# Patient Record
Sex: MALE | Race: WHITE | Employment: FULL TIME | URBAN - METROPOLITAN AREA
[De-identification: names, ages, dates, MRNs, and addresses within clinical notes are randomized per-mention and may not be internally consistent; named-entity substitution may affect disease eponyms.]

---

## 2017-08-22 ENCOUNTER — TELEPHONE (OUTPATIENT)
Dept: FAMILY MEDICINE CLINIC | Age: 52
End: 2017-08-22

## 2017-08-24 ENCOUNTER — HOSPITAL ENCOUNTER (OUTPATIENT)
Dept: OTHER | Age: 52
Discharge: HOME | End: 2017-08-24
Attending: INTERNAL MEDICINE | Admitting: INTERNAL MEDICINE

## 2017-08-24 ENCOUNTER — OFFICE VISIT (OUTPATIENT)
Dept: FAMILY MEDICINE CLINIC | Age: 52
End: 2017-08-24

## 2017-08-24 VITALS
RESPIRATION RATE: 12 BRPM | HEART RATE: 55 BPM | OXYGEN SATURATION: 96 % | SYSTOLIC BLOOD PRESSURE: 111 MMHG | BODY MASS INDEX: 24.96 KG/M2 | HEIGHT: 67 IN | DIASTOLIC BLOOD PRESSURE: 65 MMHG | WEIGHT: 159 LBS

## 2017-08-24 DIAGNOSIS — M79.672 LEFT FOOT PAIN: ICD-10-CM

## 2017-08-24 DIAGNOSIS — M79.672 LEFT FOOT PAIN: Primary | ICD-10-CM

## 2017-08-24 LAB — VITAMIN D 25-HYDROXY: 40.1 NG/ML

## 2017-08-24 PROCEDURE — 99213 OFFICE O/P EST LOW 20 MIN: CPT | Performed by: INTERNAL MEDICINE

## 2017-08-25 DIAGNOSIS — M79.89 LIMB SWELLING: Primary | ICD-10-CM

## 2017-08-28 ENCOUNTER — TELEPHONE (OUTPATIENT)
Dept: FAMILY MEDICINE CLINIC | Age: 52
End: 2017-08-28

## 2017-08-30 ENCOUNTER — TELEPHONE (OUTPATIENT)
Dept: FAMILY MEDICINE CLINIC | Age: 52
End: 2017-08-30

## 2017-09-01 ENCOUNTER — TELEPHONE (OUTPATIENT)
Dept: FAMILY MEDICINE CLINIC | Age: 52
End: 2017-09-01

## 2017-09-01 DIAGNOSIS — M79.89 LIMB SWELLING: Primary | ICD-10-CM

## 2017-09-30 ENCOUNTER — HOSPITAL ENCOUNTER (OUTPATIENT)
Dept: VASCULAR LAB | Age: 52
Discharge: HOME | End: 2017-09-30
Attending: INTERNAL MEDICINE | Admitting: INTERNAL MEDICINE

## 2017-09-30 DIAGNOSIS — M79.89 OTHER DISORDERS OF SOFT TISSUE: ICD-10-CM

## 2017-10-03 ENCOUNTER — TELEPHONE (OUTPATIENT)
Dept: FAMILY MEDICINE CLINIC | Age: 52
End: 2017-10-03

## 2018-05-21 ENCOUNTER — OFFICE VISIT (OUTPATIENT)
Dept: FAMILY MEDICINE CLINIC | Age: 53
End: 2018-05-21

## 2018-05-21 VITALS
DIASTOLIC BLOOD PRESSURE: 64 MMHG | HEIGHT: 67 IN | TEMPERATURE: 99.1 F | WEIGHT: 154 LBS | BODY MASS INDEX: 24.17 KG/M2 | HEART RATE: 70 BPM | SYSTOLIC BLOOD PRESSURE: 99 MMHG

## 2018-05-21 DIAGNOSIS — K52.9 AGE (ACUTE GASTROENTERITIS): Primary | ICD-10-CM

## 2018-05-21 PROCEDURE — 99213 OFFICE O/P EST LOW 20 MIN: CPT | Performed by: FAMILY MEDICINE

## 2018-05-21 RX ORDER — ONDANSETRON 4 MG/1
4 TABLET, FILM COATED ORAL EVERY 8 HOURS PRN
Qty: 20 TABLET | Refills: 0 | Status: SHIPPED | OUTPATIENT
Start: 2018-05-21 | End: 2019-01-18 | Stop reason: ALTCHOICE

## 2018-05-21 ASSESSMENT — ENCOUNTER SYMPTOMS
ABDOMINAL PAIN: 1
DIARRHEA: 1
RESPIRATORY NEGATIVE: 1
NAUSEA: 1
WHEEZING: 0
VOMITING: 0
CONSTIPATION: 0
COUGH: 0
SHORTNESS OF BREATH: 0

## 2018-10-08 ENCOUNTER — OFFICE VISIT (OUTPATIENT)
Dept: FAMILY MEDICINE CLINIC | Age: 53
End: 2018-10-08
Payer: COMMERCIAL

## 2018-10-08 VITALS
DIASTOLIC BLOOD PRESSURE: 70 MMHG | HEART RATE: 46 BPM | BODY MASS INDEX: 24.64 KG/M2 | WEIGHT: 157 LBS | HEIGHT: 67 IN | SYSTOLIC BLOOD PRESSURE: 107 MMHG

## 2018-10-08 DIAGNOSIS — Z13.220 SCREENING CHOLESTEROL LEVEL: ICD-10-CM

## 2018-10-08 DIAGNOSIS — R51.9 NONINTRACTABLE HEADACHE, UNSPECIFIED CHRONICITY PATTERN, UNSPECIFIED HEADACHE TYPE: Primary | ICD-10-CM

## 2018-10-08 DIAGNOSIS — K21.00 REFLUX ESOPHAGITIS: ICD-10-CM

## 2018-10-08 DIAGNOSIS — R42 VERTIGO: ICD-10-CM

## 2018-10-08 DIAGNOSIS — Z23 NEED FOR VACCINATION: ICD-10-CM

## 2018-10-08 DIAGNOSIS — Z12.11 SCREEN FOR COLON CANCER: ICD-10-CM

## 2018-10-08 PROCEDURE — 99214 OFFICE O/P EST MOD 30 MIN: CPT | Performed by: FAMILY MEDICINE

## 2018-10-08 RX ORDER — PANTOPRAZOLE SODIUM 40 MG/1
40 TABLET, DELAYED RELEASE ORAL DAILY
Qty: 30 TABLET | Refills: 11 | Status: SHIPPED | OUTPATIENT
Start: 2018-10-08 | End: 2019-01-18 | Stop reason: ALTCHOICE

## 2018-10-08 RX ORDER — SUMATRIPTAN 100 MG/1
100 TABLET, FILM COATED ORAL
Qty: 9 TABLET | Refills: 3 | Status: SHIPPED | OUTPATIENT
Start: 2018-10-08 | End: 2020-01-13 | Stop reason: SDUPTHER

## 2018-10-08 RX ORDER — MECLIZINE HCL 12.5 MG/1
12.5 TABLET ORAL 3 TIMES DAILY PRN
Qty: 30 TABLET | Refills: 0 | Status: SHIPPED | OUTPATIENT
Start: 2018-10-08 | End: 2018-10-18

## 2018-10-08 ASSESSMENT — PATIENT HEALTH QUESTIONNAIRE - PHQ9
2. FEELING DOWN, DEPRESSED OR HOPELESS: 0
SUM OF ALL RESPONSES TO PHQ9 QUESTIONS 1 & 2: 0
SUM OF ALL RESPONSES TO PHQ QUESTIONS 1-9: 0
1. LITTLE INTEREST OR PLEASURE IN DOING THINGS: 0
SUM OF ALL RESPONSES TO PHQ QUESTIONS 1-9: 0

## 2018-10-08 ASSESSMENT — ENCOUNTER SYMPTOMS
SHORTNESS OF BREATH: 0
ABDOMINAL PAIN: 0
NAUSEA: 1
CONSTIPATION: 0
VOMITING: 0
DIARRHEA: 0
COUGH: 0

## 2018-11-12 ENCOUNTER — OFFICE VISIT (OUTPATIENT)
Dept: FAMILY MEDICINE CLINIC | Age: 53
End: 2018-11-12
Payer: COMMERCIAL

## 2018-11-12 VITALS
DIASTOLIC BLOOD PRESSURE: 64 MMHG | BODY MASS INDEX: 25.88 KG/M2 | SYSTOLIC BLOOD PRESSURE: 102 MMHG | WEIGHT: 161 LBS | HEIGHT: 66 IN | HEART RATE: 53 BPM

## 2018-11-12 DIAGNOSIS — R51.9 NONINTRACTABLE EPISODIC HEADACHE, UNSPECIFIED HEADACHE TYPE: ICD-10-CM

## 2018-11-12 DIAGNOSIS — H53.9 VISION CHANGES: ICD-10-CM

## 2018-11-12 DIAGNOSIS — K21.00 REFLUX ESOPHAGITIS: Primary | ICD-10-CM

## 2018-11-12 DIAGNOSIS — R42 VERTIGO: ICD-10-CM

## 2018-11-12 PROCEDURE — 99214 OFFICE O/P EST MOD 30 MIN: CPT | Performed by: FAMILY MEDICINE

## 2018-11-12 ASSESSMENT — ENCOUNTER SYMPTOMS
SHORTNESS OF BREATH: 0
NAUSEA: 1
ABDOMINAL PAIN: 0
DIARRHEA: 0
VOMITING: 0
COUGH: 0
CONSTIPATION: 0

## 2018-11-12 NOTE — PROGRESS NOTES
Chief Complaint   Patient presents with    Headache    Dizziness         HPI:  Laura Rodrigez is a 48 y.o. (:1965) here today for follow up on headaches and  Dizziness. He has that the headaches are not under control he said that he has had headaches that have woke him up in the middle of the night. He says that he still feels pressure between his ears. He says that he still has that the dizziness is intermittent as well. He says that he can go days without being dizzy then he will have times where his longest stretch of dizziness has lasted up to an hour. He says that the nausea he had was when the dizzy spells would come and be very intense, but he also gets nausea with his migraines. He describes his migraines as dull to quick sharp pain. He will take Excedrin Migraine if he catches the migraine soon enough if not he takes the Imitrex and has to lay down in a cold dark from. He says that right in clinic he has some dizziness and feels as though he has a feeling that he is in a fog. He says that he has new symptoms of right eye pressure (as if someone were pushing his eye from the inside out) Rates his pain today 1/10. He says that at times it's really bad. He said that he has a start burst effect in his eye right as if someone put the sun in front of his right eye. He said that he was sleeping on a couple of occasions and he had a sensation of someone holding a bright flash light to his right eye that woke him up. There are times though that he has the opposite of the bright sensation and has a black blotchy visual effect that he can see through. He says that right in clinic he has some dizziness and feels as though he has a feeling that he is in a fog. He is wondering if some of this stress (work) related or maybe allergy related.         He is compliant with his reflux medications and notes no heartburn or food sticking in their throat, regurgitation,  and bitter taste,       Review of

## 2019-01-16 ENCOUNTER — ANESTHESIA EVENT (OUTPATIENT)
Dept: ENDOSCOPY | Age: 54
End: 2019-01-16
Payer: COMMERCIAL

## 2019-01-18 RX ORDER — M-VIT,TX,IRON,MINS/CALC/FOLIC 27MG-0.4MG
1 TABLET ORAL DAILY
COMMUNITY

## 2019-01-21 ENCOUNTER — ANESTHESIA (OUTPATIENT)
Dept: ENDOSCOPY | Age: 54
End: 2019-01-21
Payer: COMMERCIAL

## 2019-01-21 ENCOUNTER — HOSPITAL ENCOUNTER (OUTPATIENT)
Age: 54
Setting detail: OUTPATIENT SURGERY
Discharge: HOME | End: 2019-01-21
Attending: INTERNAL MEDICINE | Admitting: INTERNAL MEDICINE
Payer: COMMERCIAL

## 2019-01-21 VITALS
HEART RATE: 49 BPM | BODY MASS INDEX: 24.15 KG/M2 | DIASTOLIC BLOOD PRESSURE: 60 MMHG | TEMPERATURE: 98 F | HEIGHT: 67 IN | OXYGEN SATURATION: 98 % | RESPIRATION RATE: 18 BRPM | WEIGHT: 153.88 LBS | SYSTOLIC BLOOD PRESSURE: 99 MMHG

## 2019-01-21 VITALS
OXYGEN SATURATION: 99 % | DIASTOLIC BLOOD PRESSURE: 56 MMHG | RESPIRATION RATE: 17 BRPM | SYSTOLIC BLOOD PRESSURE: 97 MMHG

## 2019-01-21 DIAGNOSIS — Z12.11 SCREEN FOR COLON CANCER: ICD-10-CM

## 2019-01-21 PROCEDURE — 3609010600 HC COLONOSCOPY POLYPECTOMY SNARE/COLD BIOPSY: Performed by: INTERNAL MEDICINE

## 2019-01-21 PROCEDURE — 7100000000 HC PACU RECOVERY - FIRST 15 MIN: Performed by: INTERNAL MEDICINE

## 2019-01-21 PROCEDURE — 6360000002 HC RX W HCPCS: Performed by: NURSE ANESTHETIST, CERTIFIED REGISTERED

## 2019-01-21 PROCEDURE — 2500000003 HC RX 250 WO HCPCS: Performed by: NURSE ANESTHETIST, CERTIFIED REGISTERED

## 2019-01-21 PROCEDURE — 7100000010 HC PHASE II RECOVERY - FIRST 15 MIN: Performed by: INTERNAL MEDICINE

## 2019-01-21 PROCEDURE — 3700000001 HC ADD 15 MINUTES (ANESTHESIA): Performed by: INTERNAL MEDICINE

## 2019-01-21 PROCEDURE — 7100000011 HC PHASE II RECOVERY - ADDTL 15 MIN: Performed by: INTERNAL MEDICINE

## 2019-01-21 PROCEDURE — 2580000003 HC RX 258: Performed by: ANESTHESIOLOGY

## 2019-01-21 PROCEDURE — 2709999900 HC NON-CHARGEABLE SUPPLY: Performed by: INTERNAL MEDICINE

## 2019-01-21 PROCEDURE — 7100000001 HC PACU RECOVERY - ADDTL 15 MIN: Performed by: INTERNAL MEDICINE

## 2019-01-21 PROCEDURE — 3700000000 HC ANESTHESIA ATTENDED CARE: Performed by: INTERNAL MEDICINE

## 2019-01-21 PROCEDURE — 88305 TISSUE EXAM BY PATHOLOGIST: CPT

## 2019-01-21 RX ORDER — PROPOFOL 10 MG/ML
INJECTION, EMULSION INTRAVENOUS CONTINUOUS PRN
Status: DISCONTINUED | OUTPATIENT
Start: 2019-01-21 | End: 2019-01-21 | Stop reason: SDUPTHER

## 2019-01-21 RX ORDER — SODIUM CHLORIDE 9 MG/ML
INJECTION, SOLUTION INTRAVENOUS CONTINUOUS
Status: DISCONTINUED | OUTPATIENT
Start: 2019-01-21 | End: 2019-01-21 | Stop reason: HOSPADM

## 2019-01-21 RX ORDER — LABETALOL HYDROCHLORIDE 5 MG/ML
5 INJECTION, SOLUTION INTRAVENOUS EVERY 10 MIN PRN
Status: DISCONTINUED | OUTPATIENT
Start: 2019-01-21 | End: 2019-01-21 | Stop reason: HOSPADM

## 2019-01-21 RX ORDER — PROMETHAZINE HYDROCHLORIDE 25 MG/ML
6.25 INJECTION, SOLUTION INTRAMUSCULAR; INTRAVENOUS
Status: DISCONTINUED | OUTPATIENT
Start: 2019-01-21 | End: 2019-01-21 | Stop reason: HOSPADM

## 2019-01-21 RX ORDER — SODIUM CHLORIDE 0.9 % (FLUSH) 0.9 %
10 SYRINGE (ML) INJECTION PRN
Status: DISCONTINUED | OUTPATIENT
Start: 2019-01-21 | End: 2019-01-21 | Stop reason: HOSPADM

## 2019-01-21 RX ORDER — LIDOCAINE HYDROCHLORIDE 20 MG/ML
INJECTION, SOLUTION EPIDURAL; INFILTRATION; INTRACAUDAL; PERINEURAL PRN
Status: DISCONTINUED | OUTPATIENT
Start: 2019-01-21 | End: 2019-01-21 | Stop reason: SDUPTHER

## 2019-01-21 RX ORDER — SODIUM CHLORIDE 0.9 % (FLUSH) 0.9 %
10 SYRINGE (ML) INJECTION EVERY 12 HOURS SCHEDULED
Status: DISCONTINUED | OUTPATIENT
Start: 2019-01-21 | End: 2019-01-21 | Stop reason: HOSPADM

## 2019-01-21 RX ORDER — ONDANSETRON 2 MG/ML
4 INJECTION INTRAMUSCULAR; INTRAVENOUS
Status: DISCONTINUED | OUTPATIENT
Start: 2019-01-21 | End: 2019-01-21 | Stop reason: HOSPADM

## 2019-01-21 RX ORDER — PROPOFOL 10 MG/ML
INJECTION, EMULSION INTRAVENOUS PRN
Status: DISCONTINUED | OUTPATIENT
Start: 2019-01-21 | End: 2019-01-21 | Stop reason: SDUPTHER

## 2019-01-21 RX ADMIN — PROPOFOL 180 MCG/KG/MIN: 10 INJECTION, EMULSION INTRAVENOUS at 11:50

## 2019-01-21 RX ADMIN — LIDOCAINE HYDROCHLORIDE 60 MG: 20 INJECTION, SOLUTION EPIDURAL; INFILTRATION; INTRACAUDAL; PERINEURAL at 11:51

## 2019-01-21 RX ADMIN — SODIUM CHLORIDE: 9 INJECTION, SOLUTION INTRAVENOUS at 10:53

## 2019-01-21 RX ADMIN — PROPOFOL 100 MG: 10 INJECTION, EMULSION INTRAVENOUS at 11:50

## 2019-01-21 ASSESSMENT — PULMONARY FUNCTION TESTS
PIF_VALUE: 0

## 2019-01-21 ASSESSMENT — PAIN SCALES - GENERAL
PAINLEVEL_OUTOF10: 0

## 2019-01-21 ASSESSMENT — PAIN - FUNCTIONAL ASSESSMENT: PAIN_FUNCTIONAL_ASSESSMENT: 0-10

## 2019-01-29 PROBLEM — R01.1 HEART MURMUR: Status: ACTIVE | Noted: 2019-01-29

## 2019-01-29 PROBLEM — Z86.010 HISTORY OF ADENOMATOUS POLYP OF COLON: Status: ACTIVE | Noted: 2019-01-29

## 2019-01-31 ENCOUNTER — TELEPHONE (OUTPATIENT)
Dept: FAMILY MEDICINE CLINIC | Age: 54
End: 2019-01-31

## 2019-01-31 ENCOUNTER — OFFICE VISIT (OUTPATIENT)
Dept: FAMILY MEDICINE CLINIC | Age: 54
End: 2019-01-31
Payer: COMMERCIAL

## 2019-01-31 VITALS
OXYGEN SATURATION: 100 % | HEART RATE: 50 BPM | HEIGHT: 67 IN | WEIGHT: 167 LBS | BODY MASS INDEX: 26.21 KG/M2 | SYSTOLIC BLOOD PRESSURE: 119 MMHG | DIASTOLIC BLOOD PRESSURE: 79 MMHG

## 2019-01-31 DIAGNOSIS — H53.9 VISUAL DISTURBANCES: ICD-10-CM

## 2019-01-31 DIAGNOSIS — E80.6 HYPERBILIRUBINEMIA: ICD-10-CM

## 2019-01-31 DIAGNOSIS — Z86.69 HISTORY OF MIGRAINE: ICD-10-CM

## 2019-01-31 DIAGNOSIS — H93.13 TINNITUS OF BOTH EARS: Primary | ICD-10-CM

## 2019-01-31 DIAGNOSIS — H91.90 HEARING PROBLEM, UNSPECIFIED LATERALITY: ICD-10-CM

## 2019-01-31 DIAGNOSIS — Z86.010 HISTORY OF ADENOMATOUS POLYP OF COLON: ICD-10-CM

## 2019-01-31 PROCEDURE — 99214 OFFICE O/P EST MOD 30 MIN: CPT | Performed by: INTERNAL MEDICINE

## 2020-01-13 ENCOUNTER — OFFICE VISIT (OUTPATIENT)
Dept: FAMILY MEDICINE CLINIC | Age: 55
End: 2020-01-13
Payer: COMMERCIAL

## 2020-01-13 VITALS
SYSTOLIC BLOOD PRESSURE: 127 MMHG | HEART RATE: 54 BPM | OXYGEN SATURATION: 99 % | HEIGHT: 67 IN | WEIGHT: 167 LBS | BODY MASS INDEX: 26.21 KG/M2 | RESPIRATION RATE: 12 BRPM | DIASTOLIC BLOOD PRESSURE: 78 MMHG

## 2020-01-13 LAB
BILIRUBIN URINE: NEGATIVE
BLOOD, URINE: NEGATIVE
CLARITY: CLEAR
COLOR: YELLOW
EPITHELIAL CELLS, UA: 0 /HPF (ref 0–5)
GLUCOSE URINE: NEGATIVE MG/DL
HYALINE CASTS: 0 /LPF (ref 0–8)
KETONES, URINE: NEGATIVE MG/DL
LEUKOCYTE ESTERASE, URINE: NEGATIVE
MICROSCOPIC EXAMINATION: NORMAL
NITRITE, URINE: NEGATIVE
PH UA: 6.5 (ref 5–8)
PROTEIN UA: NEGATIVE MG/DL
RBC UA: 0 /HPF (ref 0–4)
SPECIFIC GRAVITY UA: 1.01 (ref 1–1.03)
URINE TYPE: NORMAL
UROBILINOGEN, URINE: 0.2 E.U./DL
WBC UA: 0 /HPF (ref 0–5)

## 2020-01-13 PROCEDURE — 99213 OFFICE O/P EST LOW 20 MIN: CPT | Performed by: INTERNAL MEDICINE

## 2020-01-13 PROCEDURE — 90471 IMMUNIZATION ADMIN: CPT | Performed by: INTERNAL MEDICINE

## 2020-01-13 PROCEDURE — 90686 IIV4 VACC NO PRSV 0.5 ML IM: CPT | Performed by: INTERNAL MEDICINE

## 2020-01-13 RX ORDER — NAPROXEN 500 MG/1
500 TABLET ORAL 2 TIMES DAILY WITH MEALS
Qty: 28 TABLET | Refills: 0 | Status: SHIPPED | OUTPATIENT
Start: 2020-01-13 | End: 2021-08-02

## 2020-01-13 RX ORDER — SUMATRIPTAN 100 MG/1
100 TABLET, FILM COATED ORAL
Qty: 1 TABLET | Refills: 0 | Status: SHIPPED | OUTPATIENT
Start: 2020-01-13 | End: 2021-08-02

## 2020-01-13 RX ORDER — DOXYCYCLINE HYCLATE 100 MG
100 TABLET ORAL 2 TIMES DAILY
Qty: 20 TABLET | Refills: 0 | Status: SHIPPED | OUTPATIENT
Start: 2020-01-13 | End: 2020-01-23

## 2020-01-13 ASSESSMENT — PATIENT HEALTH QUESTIONNAIRE - PHQ9
SUM OF ALL RESPONSES TO PHQ9 QUESTIONS 1 & 2: 2
SUM OF ALL RESPONSES TO PHQ QUESTIONS 1-9: 2
1. LITTLE INTEREST OR PLEASURE IN DOING THINGS: 1
2. FEELING DOWN, DEPRESSED OR HOPELESS: 1
SUM OF ALL RESPONSES TO PHQ QUESTIONS 1-9: 2

## 2020-01-13 NOTE — PROGRESS NOTES
TMs unremarkable. Throat clear. Pink conjunctive a. Lungs are clear. No wheezes rales or rhonchi. Cardiovascular exam regular rate and rhythm without any murmur click. No gallop or S4. Abdomen is benign no hepatosplenomegaly. Open inguinal ring on the left. Fullness left scrotum. No point tenderness I detect no mass. No erythema. No warmth. Chemistry        Component Value Date/Time     10/21/2014    K 4.4 10/21/2014     10/21/2014    CO2 31 10/21/2014    BUN 16 10/21/2014    CREATININE 1.33 10/21/2014        Component Value Date/Time    CALCIUM 9.6 12/09/2011 1042    ALKPHOS 69 12/09/2011 1042    AST 27 10/21/2014    ALT 21 10/21/2014    BILITOT 1.4 10/21/2014    BILITOT 2.7 10/11/2011            No results found for: WBC, HGB, HCT, MCV, PLT  Lab Results   Component Value Date    LABA1C 5.1 10/21/2014     No results found for: EAG  Lab Results   Component Value Date    LABA1C 5.1 10/21/2014     No components found for: CHLPL  Lab Results   Component Value Date    TRIG 65 10/21/2014    TRIG 80 12/09/2011    TRIG 88 10/11/2011     Lab Results   Component Value Date    HDL 63 10/21/2014    HDL 50 12/09/2011    HDL 53 10/11/2011     Lab Results   Component Value Date    LDLCALC 98 10/21/2014    LDLCALC 106 (H) 12/09/2011    LDLCALC 120 10/11/2011     Lab Results   Component Value Date    LABVLDL 16 12/09/2011       Old labs and records reviewed or requested  Discussed past lab and studies with patient      Diagnosis Orders   1. Scrotal pain  US SCROTUM AND TESTICLES    Urinalysis with Microscopic    C.trachomatis N.gonorrhoeae DNA, Urine   2. Nonintractable headache, unspecified chronicity pattern, unspecified headache type  SUMAtriptan (IMITREX) 100 MG tablet   3. History of adenomatous polyp of colon     4. Need for influenza vaccination  INFLUENZA, QUADV, 3 YRS AND OLDER, IM PF, PREFILL SYR OR SDV, 0.5ML (AFLURIA QUADV, PF)     No pain. Minimal.  Swelling probable hydrocele. Soundwave Naprosyn and doxycycline. Headaches doing well refill on his anti-migraine medication. History adenomatous polyp. Recheck in 2024. Flu vaccine given. Follow-up with me or urologist depending on results      No follow-ups on file. Diagnosis and treatment discussed.   Possible side effects of medication reviewed  Patients questions answered  Follow up understood  Pt aware if they are not contacted about any test results , this does not mean they are normal.  They should call

## 2020-01-14 ENCOUNTER — HOSPITAL ENCOUNTER (OUTPATIENT)
Dept: ULTRASOUND IMAGING | Age: 55
Discharge: HOME | End: 2020-01-14
Payer: COMMERCIAL

## 2020-01-14 PROBLEM — N50.89 SCROTAL MASS: Status: ACTIVE | Noted: 2020-01-14

## 2020-01-14 PROCEDURE — 76870 US EXAM SCROTUM: CPT

## 2020-01-15 ENCOUNTER — TELEPHONE (OUTPATIENT)
Dept: FAMILY MEDICINE CLINIC | Age: 55
End: 2020-01-15

## 2020-01-15 LAB
C. TRACHOMATIS DNA ,URINE: NEGATIVE
N. GONORRHOEAE DNA, URINE: NEGATIVE

## 2020-01-15 NOTE — TELEPHONE ENCOUNTER
Patient says he spoke with you last night and says he would like to see the neurologist and would like a call back with information.

## 2020-01-27 PROBLEM — K40.90 LEFT INGUINAL HERNIA: Status: ACTIVE | Noted: 2020-01-27

## 2020-01-30 ENCOUNTER — TELEPHONE (OUTPATIENT)
Dept: FAMILY MEDICINE CLINIC | Age: 55
End: 2020-01-30

## 2020-01-31 NOTE — TELEPHONE ENCOUNTER
Please put him in 120-2 on Monday and leave a message that he is scheduled for a preop then.  Put him on the schedule

## 2020-02-03 ENCOUNTER — OFFICE VISIT (OUTPATIENT)
Dept: FAMILY MEDICINE CLINIC | Age: 55
End: 2020-02-03
Payer: COMMERCIAL

## 2020-02-03 VITALS
BODY MASS INDEX: 26.21 KG/M2 | HEART RATE: 49 BPM | WEIGHT: 167 LBS | SYSTOLIC BLOOD PRESSURE: 116 MMHG | DIASTOLIC BLOOD PRESSURE: 78 MMHG | HEIGHT: 67 IN | RESPIRATION RATE: 12 BRPM | OXYGEN SATURATION: 97 %

## 2020-02-03 LAB
ANION GAP SERPL CALCULATED.3IONS-SCNC: 11 MMOL/L (ref 3–16)
APTT: 34.4 SEC (ref 24.2–36.2)
BUN BLDV-MCNC: 15 MG/DL (ref 7–20)
CALCIUM SERPL-MCNC: 10.1 MG/DL (ref 8.3–10.6)
CHLORIDE BLD-SCNC: 99 MMOL/L (ref 99–110)
CO2: 28 MMOL/L (ref 21–32)
CREAT SERPL-MCNC: 1.1 MG/DL (ref 0.9–1.3)
GFR AFRICAN AMERICAN: >60
GFR NON-AFRICAN AMERICAN: >60
GLUCOSE BLD-MCNC: 73 MG/DL (ref 70–99)
HCT VFR BLD CALC: 40.6 % (ref 40.5–52.5)
HEMOGLOBIN: 13.7 G/DL (ref 13.5–17.5)
INR BLD: 0.97 (ref 0.86–1.14)
MCH RBC QN AUTO: 30.2 PG (ref 26–34)
MCHC RBC AUTO-ENTMCNC: 33.6 G/DL (ref 31–36)
MCV RBC AUTO: 89.7 FL (ref 80–100)
PDW BLD-RTO: 13.1 % (ref 12.4–15.4)
PLATELET # BLD: 222 K/UL (ref 135–450)
PMV BLD AUTO: 9.7 FL (ref 5–10.5)
POTASSIUM SERPL-SCNC: 4.3 MMOL/L (ref 3.5–5.1)
PROTHROMBIN TIME: 11.2 SEC (ref 10–13.2)
RBC # BLD: 4.52 M/UL (ref 4.2–5.9)
SODIUM BLD-SCNC: 138 MMOL/L (ref 136–145)
WBC # BLD: 5.4 K/UL (ref 4–11)

## 2020-02-03 PROCEDURE — 99242 OFF/OP CONSLTJ NEW/EST SF 20: CPT | Performed by: INTERNAL MEDICINE

## 2020-02-03 PROCEDURE — 93000 ELECTROCARDIOGRAM COMPLETE: CPT | Performed by: INTERNAL MEDICINE

## 2020-02-03 NOTE — LETTER
February 3, 2020    [address] [place] [address] 6 [place] [address]      Preoperative Consultation      Lesly Tejeda  YOB: 1965    Date of Service:  2/3/2020    Vitals:    02/03/20 1321   BP: 116/78   Pulse: (!) 49   Resp: 12   SpO2: 97%   Weight: 167 lb (75.8 kg)   Height: 5' 7\" (1.702 m)      Wt Readings from Last 2 Encounters:   01/13/20 167 lb (75.8 kg)   01/31/19 167 lb (75.8 kg)     BP Readings from Last 3 Encounters:   01/13/20 127/78   01/31/19 119/79   01/21/19 (!) 97/56        Chief Complaint   Patient presents with    Pre-op Exam      LEFT INGUINAL HERNIA WITH MESH 2/5/20 with Dr. Gertrude Townsend     Allergies   Allergen Reactions    Vicodin [Hydrocodone-Acetaminophen] Nausea And Vomiting    Percocet [Oxycodone-Acetaminophen] Nausea And Vomiting     SEVERE     Outpatient Medications Marked as Taking for the 2/3/20 encounter (Office Visit) with Shamika Jason MD   Medication Sig Dispense Refill    Multiple Vitamins-Minerals (THERAPEUTIC MULTIVITAMIN-MINERALS) tablet Take 1 tablet by mouth daily         This patient presents to the office today for a preoperative consultation at the request of surgeon, Armen Manzo  who plans on performing left inguinal hernia repiar with mesh  on February 5 at [address] [place] [address] .   The current problem began4 weeks  Planned anesthesia: General   Known anesthesia problems: None   Bleeding risk: No recent or remote history of abnormal bleeding  Personal or FH of DVT/PE: No    Patient objection to receiving blood products: No    Patient Active Problem List   Diagnosis    Hyperbilirubinemia    Vertigo    History of adenomatous polyp of colon    Heart murmur    Scrotal mass    Left inguinal hernia       Past Medical History:   Diagnosis Date    Anxiety     Arthritis     Chest pain 12/09/2011    Colon polyp: sigmoid  tubular adenoma 01/21/2019    Dr. Barbie Real    History of adenomatous polyp of colon 1/29/2019 1.2019 Tubular adenoma,

## 2020-02-03 NOTE — LETTER
February 3, 2020    [address] [place] [address] 6 [place] [address] [place] [address] [address]   YOB: 1965   Date of Service: 2/3/2020   Vitals       Vitals:    02/03/20 1321   BP: 116/78   Pulse: (!) 49   Resp: 12   SpO2: 97%   Weight: 167 lb (75.8 kg)   Height: 5' 7\" (1.702 m)         Wt Readings from Last 2 Encounters:   01/13/20 167 lb (75.8 kg)   01/31/19 167 lb (75.8 kg)         BP Readings from Last 3 Encounters:   01/13/20 127/78   01/31/19 119/79   01/21/19 (!) 97/56          Chief Complaint   Patient presents with   [facility] Pre-op Exam     LEFT INGUINAL HERNIA WITH MESH 2/5/20 with Dr. Ciara Islas           Allergies   Allergen Reactions    Vicodin [Hydrocodone-Acetaminophen] Nausea And Vomiting    Percocet [Oxycodone-Acetaminophen] Nausea And Vomiting     SEVERE     Active Medications                                This patient presents to the office today for a preoperative consultation at the request of surgeon, Fany Acosta who plans on performing left inguinal hernia repiar with mesh on February 5 at [address] . The current problem began4 weeks   Planned anesthesia: General   Known anesthesia problems: None   Bleeding risk: No recent or remote history of abnormal bleeding   Personal or FH of DVT/PE: No   Patient objection to receiving blood products: No       Patient Active Problem List   Diagnosis    Hyperbilirubinemia    Vertigo    History of adenomatous polyp of colon    Heart murmur    Scrotal mass    Left inguinal hernia     Past Medical History                                                                                               Past Surgical History                                                              Family History                                                                          Review of systems: Tinnitus, spermatocele, normal echocardiogram 2004. Runs 15 miles weekly without difficulties.  Transfusion post GI bleed from hemorrhoid. Guilbert syndrome. Adenomatous polyp. No family history colon cancer. Repeat colonoscopy due 2024   PMH:   Adenomatous polyp   Hemorrhoidal bleed requiring [place]   Sinus surgery   SH . Computer programing with decreased stress. . No tobacco. No drugs. [place] 3-4 weekly. Ran 15 miles weekly in past. MTTF 3 miles. FH:   - colon, prostate   + breast mom, DM,   Review of systems: Up-to-date eye exam. Positive concussions post concussive seizures. Positive migraines. Up-to-date eye exam. Denies wheezing pneumonias abnormal chest x-rays since discontinuing running in cold weather. Denies any chest pain palpitations sinus bradycardia without evidence of syncope. Occasional lightheaded when this when he gets up quickly. Rare GE reflux. History of hemorrhoids and hemorrhoidal bleed no longer an issue. Denies any kidney stones recurrent bladder infections prostatism STD concerns. No joint complaints today. Hyperpigmented macules. Positive family history of skin cancer but not melanoma. Does not use sunscreen   Physical Exam   Constitutional: He is oriented to person, place, and time. He appears well-developed and well-nourished. No distress. HENT: Throat is clear. TMs are unremarkable. TMs clear. No bruits. No adenopathy. Good range of motion of the neck. No neck tenderness. No caps. Lungs are clear. No wheezes rales or rhonchi. Good inspiration. Cardiovascular exam regular bradycardic as usual. 1/6 murmur without radiation. No ectopy. Abdomen is benign no hepatosplenomegaly or epigastric tenderness. No rebound. Good range of motion of the joints. Good pulses lower extremities. No suspicious skin lesions or nodules. EKG Interpretation: Sinus bradycardia. Unchanged rate goes up into the 60s with ambulation. Lab Review normal CBC creatinine kidney function PT PTT   Assessment:   48 y.o. patient with planned surgery as above. 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 Preoperative workup as follows: ECG, hemoglobin, hematocrit, electrolytes, creatinine, coagulation studies   2. Change in medication regimen before surgery: None   3. Prophylaxis for cardiac events with perioperative beta-blockers: Not indicated   ACC/AHA indications for pre-operative beta-blocker use:   Vascular surgery with history of postitive stress test  Intermediate or high risk surgery with history of CAD   Intermediate or high risk surgery with multiple clinical predictors of CAD- 2 of the following: history of compensated or prior heart failure, history of cerebrovascular disease, DM, or renal insufficiency  Routine administration of higher-dose, long-acting metoprolol in beta-blockernaïve patients on the day of surgery, and in the absence of dose titration is associated with an overall increase in mortality. Beta-blockers should be started days to weeks prior to surgery and titrated to pulse < 70.   4. Deep vein thrombosis prophylaxis: regimen to be chosen by surgical team   5. No contraindications to planned surgery   No evidence of coagulopathy. Blood count normal. Kidney sugar normal. EKG unchanged. No symptoms of heart block or near syncope.    Echocardiogram with trace AI and ejection fraction 60% in the past.   Cleared for surgery   April Tomas   YOB: 1965   Date of Service: 2/3/2020   Vitals       Vitals:    02/03/20 1321   BP: 116/78   Pulse: (!) 49   Resp: 12   SpO2: 97%   Weight: 167 lb (75.8 kg)   Height: 5' 7\" (1.702 m)         Wt Readings from Last 2 Encounters:   01/13/20 167 lb (75.8 kg)   01/31/19 167 lb (75.8 kg)         BP Readings from Last 3 Encounters:   01/13/20 127/78   01/31/19 119/79   01/21/19 (!) 97/56          Chief Complaint   Patient presents with   [facility] Pre-op Exam cold weather. Denies any chest pain palpitations sinus bradycardia without evidence of syncope. Occasional lightheaded when this when he gets up quickly. Rare GE reflux. History of hemorrhoids and hemorrhoidal bleed no longer an issue. Denies any kidney stones recurrent bladder infections prostatism STD concerns. No joint complaints today. Hyperpigmented macules. Positive family history of skin cancer but not melanoma. Does not use sunscreen   Physical Exam   Constitutional: He is oriented to person, place, and time. He appears well-developed and well-nourished. No distress. HENT: Throat is clear. TMs are unremarkable. TMs clear. No bruits. No adenopathy. Good range of motion of the neck. No neck tenderness. No caps. Lungs are clear. No wheezes rales or rhonchi. Good inspiration. Cardiovascular exam regular bradycardic as usual. 1/6 murmur without radiation. No ectopy. Abdomen is benign no hepatosplenomegaly or epigastric tenderness. No rebound. Good range of motion of the joints. Good pulses lower extremities. No suspicious skin lesions or nodules. EKG Interpretation: Sinus bradycardia. Unchanged rate goes up into the 60s with ambulation. Lab Review normal CBC creatinine kidney function PT PTT   Assessment:   48 y.o. patient with planned surgery as above. 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 Preoperative workup as follows: ECG, hemoglobin, hematocrit, electrolytes, creatinine, coagulation studies   2. Change in medication regimen before surgery: None   3.  Prophylaxis for cardiac events with perioperative beta-blockers: Not indicated   ACC/AHA indications for pre-operative beta-blocker use:   Vascular surgery with history of postitive stress test  Intermediate or high risk surgery with history of CAD

## 2020-02-03 NOTE — PROGRESS NOTES
Preoperative Consultation      Brandin Bautista  YOB: 1965    Date of Service:  2/3/2020    Vitals:    02/03/20 1321   BP: 116/78   Pulse: (!) 49   Resp: 12   SpO2: 97%   Weight: 167 lb (75.8 kg)   Height: 5' 7\" (1.702 m)      Wt Readings from Last 2 Encounters:   01/13/20 167 lb (75.8 kg)   01/31/19 167 lb (75.8 kg)     BP Readings from Last 3 Encounters:   01/13/20 127/78   01/31/19 119/79   01/21/19 (!) 97/56        Chief Complaint   Patient presents with    Pre-op Exam      LEFT INGUINAL HERNIA WITH MESH 2/5/20 with Dr. Kj Avila     Allergies   Allergen Reactions    Vicodin [Hydrocodone-Acetaminophen] Nausea And Vomiting    Percocet [Oxycodone-Acetaminophen] Nausea And Vomiting     SEVERE     Outpatient Medications Marked as Taking for the 2/3/20 encounter (Office Visit) with Geovanna Gaffney MD   Medication Sig Dispense Refill    Multiple Vitamins-Minerals (THERAPEUTIC MULTIVITAMIN-MINERALS) tablet Take 1 tablet by mouth daily         This patient presents to the office today for a preoperative consultation at the request of surgeon, Clement Orozco  who plans on performing left inguinal hernia repiar with mesh  on February 5 at [address] .   The current problem began4 weeks  Planned anesthesia: General   Known anesthesia problems: None   Bleeding risk: No recent or remote history of abnormal bleeding  Personal or FH of DVT/PE: No    Patient objection to receiving blood products: No    Patient Active Problem List   Diagnosis    Hyperbilirubinemia    Vertigo    History of adenomatous polyp of colon    Heart murmur    Scrotal mass    Left inguinal hernia       Past Medical History:   Diagnosis Date    Anxiety     Arthritis     Chest pain 12/09/2011    Colon polyp: sigmoid  tubular adenoma 01/21/2019    Dr. Sravani Richards    History of adenomatous polyp of colon 1/29/2019    1.2019 Tubular adenoma,    History of blood transfusion 2008    Hyperbilirubinemia 12/9/2011    Migraine     Reflux

## 2020-02-04 ENCOUNTER — TELEPHONE (OUTPATIENT)
Dept: FAMILY MEDICINE CLINIC | Age: 55
End: 2020-02-04

## 2020-02-04 NOTE — TELEPHONE ENCOUNTER
FYI- returning call.  Notified pt of message on lab result:   \"Blood count, kidney sugar and bleeding labs normal.\"

## 2020-02-17 ENCOUNTER — TELEPHONE (OUTPATIENT)
Dept: FAMILY MEDICINE CLINIC | Age: 55
End: 2020-02-17

## 2020-02-17 RX ORDER — OSELTAMIVIR PHOSPHATE 75 MG/1
75 CAPSULE ORAL 2 TIMES DAILY
Qty: 10 CAPSULE | Refills: 0 | Status: SHIPPED | OUTPATIENT
Start: 2020-02-17 | End: 2020-02-22

## 2020-02-17 NOTE — TELEPHONE ENCOUNTER
PT called in stating that he was at his specialists office today for a follow up appointment and they diagnosed him with having the flu and recommended he call our office to see if Dr. Peyton Cardoza can call in Tamiflu for him. Please send to the [facility] in [facility].      Best call back number: [phone]

## 2020-02-18 ENCOUNTER — TELEPHONE (OUTPATIENT)
Dept: FAMILY MEDICINE CLINIC | Age: 55
End: 2020-02-18

## 2020-02-18 NOTE — TELEPHONE ENCOUNTER
PT called in stating that he's been experiencing a sore throat along with the flu and he wanted to know if that's normal. PT says his wife is concerned he may have strep.      Please call back: [phone]

## 2020-02-18 NOTE — TELEPHONE ENCOUNTER
Spoke with patient. He says he just has discomfort at night for the most part and his wife is just concerned. He will call back if he feels he needs to be seen.

## 2020-06-21 ENCOUNTER — NURSE TRIAGE (OUTPATIENT)
Dept: OTHER | Facility: CLINIC | Age: 55
End: 2020-06-21

## 2020-06-21 NOTE — TELEPHONE ENCOUNTER
Reason for Disposition   Chest pain or pressure    Answer Assessment - Initial Assessment Questions  1. COVID-19 DIAGNOSIS: \"Who made your Coronavirus (COVID-19) diagnosis? \" \"Was it confirmed by a positive lab test?\" If not diagnosed by a HCP, ask \"Are there lots of cases (community spread) where you live? \" (See public health department website, if unsure)       2. ONSET: \"When did the COVID-19 symptoms start? \"      X 2 days  3. WORST SYMPTOM: \"What is your worst symptom? \" (e.g., cough, fever, shortness of breath, muscle aches)      Cough, dizziness  4. COUGH: \"Do you have a cough? \" If so, ask: \"How bad is the cough? \"       cough  5. FEVER: \"Do you have a fever? \" If so, ask: \"What is your temperature, how was it measured, and when did it start? \"     denies  6. RESPIRATORY STATUS: \"Describe your breathing? \" (e.g., shortness of breath, wheezing, unable to speak)        7. BETTER-SAME-WORSE: Osker Lavender you getting better, staying the same or getting worse compared to yesterday? \"  If getting worse, ask, \"In what way?\"       8. HIGH RISK DISEASE: \"Do you have any chronic medical problems? \" (e.g., asthma, heart or lung disease, weak immune system, etc.)       9. PREGNANCY: \"Is there any chance you are pregnant? \" \"When was your last menstrual period? \"    na  10. OTHER SYMPTOMS: \"Do you have any other symptoms? \"  (e.g., chills, fatigue, headache, loss of smell or taste, muscle pain, sore throat)       headache    Protocols used: CORONAVIRUS (COVID-19) DIAGNOSED OR SUSPECTED-ADULT-    Pt called c/o cough, fatigue and headache x 2 days. .. denies known exposure. .. per protocol, pt to see pcp today although pcp office is closed on Sunday, pt routed to [facility]/ED

## 2020-06-22 ENCOUNTER — OFFICE VISIT (OUTPATIENT)
Dept: PRIMARY CARE CLINIC | Age: 55
End: 2020-06-22
Payer: COMMERCIAL

## 2020-06-22 VITALS — TEMPERATURE: 98.1 F | OXYGEN SATURATION: 99 % | HEART RATE: 54 BPM

## 2020-06-22 PROCEDURE — 99213 OFFICE O/P EST LOW 20 MIN: CPT | Performed by: INTERNAL MEDICINE

## 2020-06-22 NOTE — PROGRESS NOTES
FLU/COVID-19 CLINIC  2020  Patient ID:  Kyle Rodriguez is a 50 y.o. male  : 1965    HPI/SYMPTOMS: Pt states he has been sick for about 5 days.      Symptom duration, days:  [] 1   [] 2   [] 3   [x] 4 - 7  [] 8 - 10   [] 11 - 13   [] >14    IF THE BELOW SYMPTOMS ARE NOT CHECKED, THEN THEY ARE NEGATIVE:  [] Fevers  {  [] Symptom (not measured)  [] Measured (Result:  degrees)  [] Chills  [x] Cough   [] Dry   [] Productive  [] Coughing up blood    [] Short of breath  [] Rest  [] Exertion only   [] Chest pain     [] Chest tightness  [] Chest congestion  [] Nasal congestion  [] Sneezing  [] Loss of smell or taste  [] Sore throat  [x] Headaches  [] Tolerable  [] Severe     [x] Muscle aches  [x] Nausea  [] Vomiting  []Unable to keep fluids down     [] Diarrhea  []Severe     [] OTHER SYMPTOMS:      Symptom course:   [] Worsening     [] Stable     [] Improving    RISK FACTORS (if not checked they are negative) :  [] Pregnant or possibly pregnant  [] Age over 61  [] Diabetes  [] Heart disease  [] Asthma  [] COPD/Other chronic lung diseases  [] Active Cancer  [] On Chemotherapy  [] Taking oral steroids  [] History Lymphoma/Leukemia  [] Close contact with a lab confirmed COVID-19 patient within 14 days of symptom onset  [] History of travel from affected geographical areas within 14 days of symptom onset     []Tobacco history  []Other:      ALLERGIES  Allergies   Allergen Reactions    Vicodin [Hydrocodone-Acetaminophen] Nausea And Vomiting    Percocet [Oxycodone-Acetaminophen] Nausea And Vomiting     SEVERE           VITALS:  Vitals:    20 1505   Pulse: 54   Temp: 98.1 °F (36.7 °C)   SpO2: 99%       Physical Exam:  [x]Alert  [x]Oriented to person/place/time   []No apparent distress   []Ill appearing   [x]Lips are cyanotic   [x]Breathing appears normal   []Patient can speak in full sentences  []Appears tachypneic     TESTS ORDERED:  [] POC Flu ordered  [] POC Strep ordered  [x] COVID 19 ordered    RESULTS:  No results found for this visit on 06/22/20. ASSESSMENT/PLAN:  Diagnoses and all orders for this visit:    Suspected COVID-19 virus infection  -     COVID-19 Ambulatory; Future  -     COVID-19 Ambulatory        Patient instructed to call PCP if symptoms worsen or fail to improve, and to go to the ED if develops red flags (these symptoms were reviewed with patient). Patient informed can also return to this site for reassessment of related symptoms. Patient expressed understanding. Discharge home with written instructions for:  [] Flu management including medication treatment if qualifies  [] CDC guildelines for self-quarantine in an asymptomatic patient with COVID-19 exposure   (2 weeks if no symptoms. If symptoms develop then patient follows isolation guidelines below.)  [x] CDC guidelines for isolation in symptomatic patient with assumed COVID-19;        TO END ISOLATION: (Patient understands these guidelines are subject to change)  1. No fever for at least 72 hours without medications AND  2. Symptoms have resolved AND  3. At least 10 days from initial symptom onset    Patient understands that a hands-on exam could not be completed during this high risk assessment due to the COVID-19 pandemic. This note will be routed to the patient's PCP to inform of this limited assessment. An  electronic signature was used to authenticate this note. Manolo Sotomayor MA acting as a scribe for Dr Christy Burks on 6/22/2020 at 3:06 PM    I, Lia Walton MD, personally performed the services described in the documentation as scribed by Alberta Ferrell, in my presence and it is both accurate and complete.     Electronically signed by Lia Walton MD on 6/22/2020 at 4:57 PM.

## 2020-06-24 LAB
SARS-COV-2: NOT DETECTED
SOURCE: NORMAL

## 2020-06-24 NOTE — RESULT ENCOUNTER NOTE
(part of systemic allergic issues.  Seeing Allergist at this time). Please contact patient with their testing results: Your test for COVID-19, also known as novel coronavirus, came back negative. No virus was detected from the sample collected. Until your symptoms are fully resolved, you may still be contagious. We recommend that you remain isolated for 7 days minimum or 72 hours after your symptoms have completely resolved, whichever is longer. Continually monitor symptoms. Contact a medical provider if symptoms are worsening. If you have any additional questions, contact your PCP.     For additional information, please visit the Centers for Disease Control and Prevention   [email or web address]

## 2020-07-27 ENCOUNTER — TELEPHONE (OUTPATIENT)
Dept: FAMILY MEDICINE CLINIC | Age: 55
End: 2020-07-27

## 2020-07-27 NOTE — TELEPHONE ENCOUNTER
PT says his job is allowing him to work from home as long as pcp Writes him a letter that says something along the lines     \"due to the [address] PT is allowed to work from [employer] job says that's all he is needing for HR to allow him to work from home.

## 2020-07-27 NOTE — LETTER
[address],[place] [address]  Phone: [phone]  Fax: [phone]    Kalee Colbert MD        July 27, 2020     Patient: Fabián Mclean   YOB: 1965   Date of Visit: 7/27/2020       To Whom It May Concern: It is my medical opinion that Kash Soliman would benefit by working from home during the covid 19 pandemic.         Sincerely,          Kalee Colbert MD

## 2020-08-17 ENCOUNTER — OFFICE VISIT (OUTPATIENT)
Dept: PRIMARY CARE CLINIC | Age: 55
End: 2020-08-17
Payer: COMMERCIAL

## 2020-08-17 PROCEDURE — 99211 OFF/OP EST MAY X REQ PHY/QHP: CPT | Performed by: NURSE PRACTITIONER

## 2020-08-17 NOTE — PROGRESS NOTES
Romelia Pierre received a viral test for COVID-19. They were educated on isolation and quarantine as appropriate. For any symptoms, they were directed to seek care from their PCP, given contact information to establish with a doctor, directed to an urgent care or the emergency room.

## 2020-08-18 LAB — SARS-COV-2, NAA: NOT DETECTED

## 2021-01-04 ENCOUNTER — TELEPHONE (OUTPATIENT)
Dept: FAMILY MEDICINE CLINIC | Age: 56
End: 2021-01-04

## 2021-01-04 ENCOUNTER — VIRTUAL VISIT (OUTPATIENT)
Dept: FAMILY MEDICINE CLINIC | Age: 56
End: 2021-01-04
Payer: COMMERCIAL

## 2021-01-04 DIAGNOSIS — Z20.822 ENCOUNTER BY TELEHEALTH FOR SUSPECTED COVID-19: Primary | ICD-10-CM

## 2021-01-04 PROCEDURE — 99213 OFFICE O/P EST LOW 20 MIN: CPT | Performed by: INTERNAL MEDICINE

## 2021-01-04 ASSESSMENT — PATIENT HEALTH QUESTIONNAIRE - PHQ9: 1. LITTLE INTEREST OR PLEASURE IN DOING THINGS: 0

## 2021-01-04 NOTE — PROGRESS NOTES
The below patient isbeing evaluated by a Virtual Visit (video visit) encounter to address concerns as mentioned above. A caregiver was present when appropriate. Due to this being a TeleHealth encounter (During NKSQS-93 public health emergency), evaluation of the following organ systems was limited: Vitals/Constitutional/EENT/Resp/CV/GI//MS/Neuro/Skin/Heme-Lymph-Imm. Pursuant to the emergency declaration under the [address] [place] [address] and the [employer] Act, this Virtual Visit was conducted with patient's (and/or legal guardian's) consent, to reduce the patient's risk of exposure to COVID-19 and provide necessary medical care. The patient (and/or legal guardian) has also been advised to contact this office for worsening conditions or problems, and seek emergency medical treatment and/or call 911 if deemed necessary. Patient identification was verified at the start of the visit: Yes    Total time spent on this encounter: Not billed by time    Services were provided through a video synchronous discussion virtually to substitute for in-person clinic visit. Patient and provider were located at their individual homes. --Silverio Elizabeth MD on 1/4/2021 at 12:58 PM    An electronic signature was used to authenticate this note. HPI: Sierra Love presents for evaluation and management of viral syndrome      3 days ago felt ill and fatigued with chills. Felt warm. Slept 15 hours. + sore throat. Decreased sense of taste. Used halls. + headache. Ecedrin. No hemoptysis drinking. No diarrhea. No known exposure. + joint achiness. + foggy on occasionally no history of recurrent sinusitis. Wife is ill. No shortness of breath. Positive myalgias.  first day of illness. Chronic headaches. Uses Excedrin Migraine. Wants no other medications at this time.     PMH:    Adenomatous polyp  Hemorrhoidal bleed requiring TX  Sinus surgery           SH .  Computer programing. stress. . No tobacco. No drugs. Baker 3-4 weekly. Ran 15  miles weekly in past. MTTF 3 miles.      FH:     - colon, prostate     + breast mom, DM,      Review of systems: Up-to-date eye exam. Positive concussions post concussive seizures. Positive migraines. Up-to-date eye exam. Denies wheezing pneumonias abnormal chest x-rays since discontinuing running in cold weather. Denies any chest pain palpitations sinus bradycardia without evidence of syncope. Occasional lightheaded when this when he gets up quickly. Rare GE reflux. History of hemorrhoids and hemorrhoidal bleed no longer an issue. Denies any kidney stones recurrent bladder infections prostatism STD concerns. No joint complaints today. Hyperpigmented macules. Positive family history of skin cancer but not melanoma.  Does not use sunscreen      Constitutional, ent, CV, respiratory, GI, , joint, skin, allergic and psychiatric ROS reviewed and negative except for above    Allergies   Allergen Reactions    Vicodin [Hydrocodone-Acetaminophen] Nausea And Vomiting    Percocet [Oxycodone-Acetaminophen] Nausea And Vomiting     SEVERE       Outpatient Medications Marked as Taking for the 1/4/21 encounter (Virtual Visit) with Frederick Villa MD   Medication Sig Dispense Refill    naproxen (NAPROSYN) 500 MG tablet Take 1 tablet by mouth 2 times daily (with meals) 28 tablet 0    Multiple Vitamins-Minerals (THERAPEUTIC MULTIVITAMIN-MINERALS) tablet Take 1 tablet by mouth daily               Past Medical History:   Diagnosis Date    Anxiety     Arthritis     Chest pain 12/09/2011    Colon polyp: sigmoid  tubular adenoma 01/21/2019    Dr. Lamonte Loyola    History of adenomatous polyp of colon 1/29/2019 1.2019 Tubular adenoma,    History of blood transfusion 2008    Hyperbilirubinemia 12/9/2011    Migraine     Reflux esophagitis     Scrotal mass 1/14/2020 1.2020 Trace bilateral hydroceles.   3 mm left epididymal cyst or spermatocele.           Past Surgical History:   Procedure Laterality Date    COLONOSCOPY      COLONOSCOPY N/A 1/21/2019    COLONOSCOPY POLYPECTOMY SNARE/COLD BIOPSY performed by Micah Shipman MD at [facility] [address] LASIK      NASAL SEPTUM SURGERY               Family History   Problem Relation Age of Onset    Cancer Mother         breast    Diabetes Mother     Heart Disease Father 47        CAD    High Blood Pressure Father     Cancer Maternal Aunt     Diabetes Maternal Grandmother          Review of Systems      Chemistry        Component Value Date/Time     02/03/2020 1403    K 4.3 02/03/2020 1403    CL 99 02/03/2020 1403    CO2 28 02/03/2020 1403    BUN 15 02/03/2020 1403    CREATININE 1.1 02/03/2020 1403        Component Value Date/Time    CALCIUM 10.1 02/03/2020 1403    ALKPHOS 69 12/09/2011 1042    AST 27 10/21/2014    ALT 21 10/21/2014    BILITOT 1.4 10/21/2014    BILITOT 2.7 10/11/2011            NO vitals  as this was a virtual visit during cvod19 pandemic  He is appropriate. 1 cough. No rhinorrhea noted. Normal voice. No tachypnea.   Speaks in full sentences without difficulty    Lab Results   Component Value Date    WBC 5.4 02/03/2020    HGB 13.7 02/03/2020    HCT 40.6 02/03/2020    MCV 89.7 02/03/2020     02/03/2020     Lab Results   Component Value Date    LABA1C 5.1 10/21/2014     No results found for: EAG  Lab Results   Component Value Date    LABA1C 5.1 10/21/2014     No components found for: CHLPL  Lab Results   Component Value Date    TRIG 65 10/21/2014    TRIG 80 12/09/2011    TRIG 88 10/11/2011     Lab Results   Component Value Date    HDL 63 10/21/2014    HDL 50 12/09/2011    HDL 53 10/11/2011     Lab Results   Component Value Date    LDLCALC 98 10/21/2014    LDLCALC 106 (H) 12/09/2011    LDLCALC 120 10/11/2011     Lab Results   Component Value Date    LABVLDL 16 12/09/2011 Old labs and records reviewed or requested  Discussed past lab and studies with patient      Diagnosis Orders   1. Encounter by telehealth for suspected COVID-19       Has testing 8:00 tomorrow. Will isolate. Over-the-counter medications for symptoms. Knows symptoms which would warrant ER visit    No follow-ups on file. Diagnosis and treatment discussed.   Possible side effects of medication reviewed  Patients questions answered  Follow up understood  Pt aware if they are not contacted about any test results , this does not mean they are normal.  They should call

## 2021-01-04 NOTE — TELEPHONE ENCOUNTER
PT's wife called in wanting to schedule a VV for both him and his wife    She states that PT has a bad sore throat, body aches, chills and congestion.      Please call back if able to be seen: [phone]

## 2021-01-05 ENCOUNTER — OFFICE VISIT (OUTPATIENT)
Dept: PRIMARY CARE CLINIC | Age: 56
End: 2021-01-05
Payer: COMMERCIAL

## 2021-01-05 DIAGNOSIS — Z20.822 SUSPECTED COVID-19 VIRUS INFECTION: Primary | ICD-10-CM

## 2021-01-05 PROCEDURE — 99211 OFF/OP EST MAY X REQ PHY/QHP: CPT | Performed by: NURSE PRACTITIONER

## 2021-01-05 NOTE — PROGRESS NOTES
Godwin Voss received a viral test for COVID-19. They were educated on isolation and quarantine as appropriate. For any symptoms, they were directed to seek care from their PCP, given contact information to establish with a doctor, directed to an urgent care or the emergency room.

## 2021-01-06 LAB — SARS-COV-2, NAA: NOT DETECTED

## 2021-06-30 ENCOUNTER — PATIENT MESSAGE (OUTPATIENT)
Dept: FAMILY MEDICINE CLINIC | Age: 56
End: 2021-06-30

## 2021-06-30 NOTE — TELEPHONE ENCOUNTER
From: Minus Opitz  To: Aspen Chahal MD  Sent: 6/30/2021 10:01 AM EDT  Subject: Non-Urgent Medical Question    This is related to [address] I cannot pay my bill via [employer]. Every time I click on the payment I get an error. Attached is the error that I get. Please pass this on to Epic.  Thanks

## 2021-07-07 NOTE — TELEPHONE ENCOUNTER
Emanuel White. I have reached out to our MyChart support team and IT, a ticket has been logged. I called and LVM for the patient regarding this issue.

## 2021-07-08 NOTE — TELEPHONE ENCOUNTER
Hello team. I spoke with the patient on the phone regarding paying online. I provided him with the [employer] help desk number for online payment support and discussed alternative payment avenues to pay his bill, if the [employer] payment error issue has not been resolved. He stated he was able to make payment on the [facility] website.

## 2021-08-02 ENCOUNTER — HOSPITAL ENCOUNTER (OUTPATIENT)
Age: 56
Discharge: HOME | End: 2021-08-02
Payer: COMMERCIAL

## 2021-08-02 ENCOUNTER — HOSPITAL ENCOUNTER (OUTPATIENT)
Dept: GENERAL RADIOLOGY | Age: 56
Discharge: HOME | End: 2021-08-02
Payer: COMMERCIAL

## 2021-08-02 ENCOUNTER — OFFICE VISIT (OUTPATIENT)
Dept: FAMILY MEDICINE CLINIC | Age: 56
End: 2021-08-02
Payer: COMMERCIAL

## 2021-08-02 VITALS
OXYGEN SATURATION: 98 % | RESPIRATION RATE: 12 BRPM | BODY MASS INDEX: 25.74 KG/M2 | HEIGHT: 67 IN | DIASTOLIC BLOOD PRESSURE: 74 MMHG | WEIGHT: 164 LBS | HEART RATE: 47 BPM | SYSTOLIC BLOOD PRESSURE: 116 MMHG

## 2021-08-02 DIAGNOSIS — M79.671 FOOT PAIN, RIGHT: ICD-10-CM

## 2021-08-02 DIAGNOSIS — R00.1 BRADYCARDIA: Primary | ICD-10-CM

## 2021-08-02 DIAGNOSIS — R07.89 ATYPICAL CHEST PAIN: ICD-10-CM

## 2021-08-02 DIAGNOSIS — Z13.220 SCREENING FOR HYPERLIPIDEMIA: ICD-10-CM

## 2021-08-02 DIAGNOSIS — M79.89 FOOT SWELLING: ICD-10-CM

## 2021-08-02 LAB
CHOLESTEROL, TOTAL: 177 MG/DL (ref 0–199)
HDLC SERPL-MCNC: 49 MG/DL (ref 40–60)
LDL CHOLESTEROL CALCULATED: 109 MG/DL
RHEUMATOID FACTOR: <10 IU/ML
SEDIMENTATION RATE, ERYTHROCYTE: 10 MM/HR (ref 0–20)
TRIGL SERPL-MCNC: 95 MG/DL (ref 0–150)
URIC ACID, SERUM: 6 MG/DL (ref 3.5–7.2)
VLDLC SERPL CALC-MCNC: 19 MG/DL

## 2021-08-02 PROCEDURE — 99214 OFFICE O/P EST MOD 30 MIN: CPT | Performed by: INTERNAL MEDICINE

## 2021-08-02 PROCEDURE — 71046 X-RAY EXAM CHEST 2 VIEWS: CPT

## 2021-08-02 PROCEDURE — 93000 ELECTROCARDIOGRAM COMPLETE: CPT | Performed by: INTERNAL MEDICINE

## 2021-08-02 PROCEDURE — 36415 COLL VENOUS BLD VENIPUNCTURE: CPT | Performed by: INTERNAL MEDICINE

## 2021-08-02 PROCEDURE — 73630 X-RAY EXAM OF FOOT: CPT

## 2021-08-02 NOTE — PATIENT INSTRUCTIONS
Ice elevate either motrin or prednisone depending on results of film  Call for potiatry follow up  I recommend Covid and shingles vaccine when you are ready. It may be a stress fracture but will check for inflammation, gout, rheumatoid arthritis as well  Have ordered foot and chest film    If increased fluid leg, increased cough, shortness of breath urgent follow up    Patient Education        Stress Fracture of the Foot: Care Instructions  Your Care Instructions     A stress fracture is a thin, or hairline, crack in a bone. A stress fracture usually happens from repeated pressure on the foot, like running or jumping. You may need 6 to 8 weeks to heal.  Treatment depends on where the fracture is and how much pain it causes. Do not return to your usual exercise until your doctor says you can. Continued use of an injured foot can make the break worse or keep it from healing. You heal best when you take good care of yourself. Eat a variety of healthy foods, and don't smoke. Follow-up care is a key part of your treatment and safety. Be sure to make and go to all appointments, and call your doctor if you are having problems. It's also a good idea to know your test results and keep a list of the medicines you take. How can you care for yourself at home? · Be safe with medicines. Take pain medicines exactly as directed. ? If the doctor gave you a prescription medicine for pain, take it as prescribed. ? If you are not taking a prescription pain medicine, ask your doctor if you can take an over-the-counter medicine. Read and follow all instructions on the label. · Follow your doctor's instructions about how much weight you can put on your foot and when you can go back to your usual activities. Use crutches as instructed. · If your doctor suggests it, put ice or a cold pack on your foot for 10 to 20 minutes at a time.  Try to do this every 1 to 2 hours for the next 3 days (when you are awake) or until the swelling goes down. Put a thin cloth between the ice and your skin. Keep your splint or cast dry. · Prop up your foot on a pillow when you ice it or anytime you sit or lie down for the next 3 days. Try to keep it above the level of your heart. This will help reduce swelling. · Your doctor may have recommended a cast, a splint, or a special shoe or shoe insert. Follow the instructions your doctor gave you for using any of these treatments. When should you call for help? Call your doctor now or seek immediate medical care if:    · You have increased or severe pain.     · Your foot is cool or pale or changes color.     · You have tingling, weakness, or numbness in your foot and toes.     · Your cast or splint feels too tight.     · You cannot move your toes.     · You have a lot of swelling below your cast.   Watch closely for changes in your health, and be sure to contact your doctor if:    · Pain does not get better day by day.     · The skin under your cast or splint burns or stings. Where can you learn more? Go to https://Sk[employer]vment.[employer]. org and sign in to your [employer] account. Enter P090 in the [employer] box to learn more about \"Stress Fracture of the Foot: Care Instructions. \"     If you do not have an account, please click on the \"Sign Up Now\" link. Current as of: November 16, 2020               Content Version: 12.9  © 0153-1665 Healthwise, Incorporated. Care instructions adapted under license by [facility] ([facility]). If you have questions about a medical condition or this instruction, always ask your healthcare professional. [address] any warranty or liability for your use of this information.

## 2021-08-02 NOTE — PROGRESS NOTES
HPI: Lauren Rosman presents for right foot swelling, chest discomfort. Health issues include headaches, adenomatous polyp, hemorrhoidal bleed, recurrent sinusitis with sinus surgery.     2 weeks onset of swelling the right foot. Positive dorsal pain. No known trauma. Started running a little bit after that. Feels better when he runs. No acute trauma. No fevers chills diagnosis of gout inflammatory joint disease. Has had tracking issues with his knee, diagnosis of plantar fasciitis and inserts which he uses with running. No known osteopenia. No risk factors for osteoporosis. No fevers chills systemic symptoms colitis or known sarcoid. Does have resting bradycardia    pleuritic pain onset 2 weeks ago. No cough  congestion or guajardo. No new activities. No tobacco.  No shortness of breath. No hemoptysis did have some URI symptoms a couple of weeks ago. No marijuana. No masses. No point tenderness. Worse with certain movements and taking a deep deep breaths. Chronic headaches. Uses Excedrin Migraine. Rarely. Sinus bradycardia. Echo 2006 8 ejection fraction 55 to 60% slightly enlarged LA aortic root 3.2 normal valves. No syncope or presyncope. 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 States that he has a flu vaccine after he had flu he feels worse.       PMH:    Adenomatous polyp  Hemorrhoidal bleed requiring TX  Sinus surgery  Left inguinal hernia repair       SH .  Computer programing. stress. fish dog . No tobacco. No drugs. Increased bourbon. Running up again after family issues.     FH:     - colon, prostate     + breast mom, DM,      Review of systems: Up-to-date eye exam. Positive concussions . Positive migraines. Up-to-date eye exam. Denies wheezing pneumonias abnormal chest x-rays since discontinuing running in cold weather. Denies any chest pain palpitations symptomatic bradycardia without evidence of syncope. Rare GE reflux.  History of hemorrhoids and hemorrhoidal bleed no longer an issue. Denies any kidney stones recurrent bladder infections prostatism STD concerns. No joint complaints colonoscopy due 2024. History adenomatous polyp. Constitutional, ent, CV, respiratory, GI, , joint, skin, allergic and psychiatric ROS reviewed and negative except for above    Allergies   Allergen Reactions    Vicodin [Hydrocodone-Acetaminophen] Nausea And Vomiting    Percocet [Oxycodone-Acetaminophen] Nausea And Vomiting     SEVERE       Locations Motrin and Excedrin Migraine. Past Medical History:   Diagnosis Date    Anxiety     Arthritis     Chest pain 12/09/2011    Colon polyp: sigmoid  tubular adenoma 01/21/2019    Dr. Healy Scales    History of adenomatous polyp of colon 1/29/2019 1.2019 Tubular adenoma,    History of blood transfusion 2008    Hyperbilirubinemia 12/9/2011    Migraine     Reflux esophagitis     Scrotal mass 1/14/2020 1.2020   Trace bilateral hydroceles.   3 mm left epididymal cyst or spermatocele.           Past Surgical History:   Procedure Laterality Date    COLONOSCOPY      COLONOSCOPY N/A 1/21/2019    COLONOSCOPY POLYPECTOMY SNARE/COLD BIOPSY performed by Magy Blakely MD at [facility] [address]. LASIK      NASAL SEPTUM SURGERY               Family History   Problem Relation Age of Onset    Cancer Mother         breast    Diabetes Mother     Heart Disease Father 47        CAD    High Blood Pressure Father     Cancer Maternal Aunt     Diabetes Maternal Grandmother              Objective     /74   Pulse (!) 47   Resp 12   Ht 5' 7\" (1.702 m)   Wt 164 lb (74.4 kg)   SpO2 98% Comment: TA  BMI 25.69 kg/m²     @LASTSAO2(3)@    Wt Readings from Last 3 Encounters:   02/03/20 167 lb (75.8 kg)   01/13/20 167 lb (75.8 kg)   01/31/19 167 lb (75.8 kg)       Physical Exam     NAD alert and cooperative  HEENT: Pink conjunctive a. Throat is clear. Good dentition.   Lungs are clear good JAISON ratio without any wheezes rales or rhonchi. No chest wall tenderness left pectoralis muscle greater than right. No masses soft tissue defect. Cardiovascular exam regular rate and rhythm without any murmur click. Bradycardia. Abdomen is benign no hepatosplenomegaly epigastric tenderness or mass. Positive swelling dorsum right foot. Tenderness along metatarsal 3 and 4. Good range of motion. Good pulses. No erythema abrasion or warmth. He is appropriate. Normal affect. Good eye contact well-groomed    Chemistry        Component Value Date/Time     02/03/2020 1403    K 4.3 02/03/2020 1403    CL 99 02/03/2020 1403    CO2 28 02/03/2020 1403    BUN 15 02/03/2020 1403    CREATININE 1.1 02/03/2020 1403        Component Value Date/Time    CALCIUM 10.1 02/03/2020 1403    ALKPHOS 69 12/09/2011 1042    AST 27 10/21/2014 0000    ALT 21 10/21/2014 0000    BILITOT 1.4 10/21/2014 0000    BILITOT 2.7 10/11/2011 0000            Lab Results   Component Value Date    WBC 5.4 02/03/2020    HGB 13.7 02/03/2020    HCT 40.6 02/03/2020    MCV 89.7 02/03/2020     02/03/2020     Lab Results   Component Value Date    LABA1C 5.1 10/21/2014     No results found for: EAG  Lab Results   Component Value Date    LABA1C 5.1 10/21/2014     No components found for: CHLPL  Lab Results   Component Value Date    TRIG 65 10/21/2014    TRIG 80 12/09/2011    TRIG 88 10/11/2011     Lab Results   Component Value Date    HDL 63 10/21/2014    HDL 50 12/09/2011    HDL 53 10/11/2011     Lab Results   Component Value Date    LDLCALC 98 10/21/2014    LDLCALC 106 (H) 12/09/2011    LDLCALC 120 10/11/2011     Lab Results   Component Value Date    LABVLDL 16 12/09/2011       Old labs and records reviewed or requested  Discussed past lab and studies with patient      Diagnosis Orders   1. Bradycardia  EKG 12 Lead    Lipid Panel   2.  Foot pain, right  Sedimentation Rate    Uric Acid    Vitamin D 25 Hydroxy    RHEUMATOID FACTOR    XR FOOT RIGHT (MIN 3 VIEWS)

## 2021-08-03 LAB — VITAMIN D 25-HYDROXY: 44.9 NG/ML

## 2021-08-04 ENCOUNTER — IMMUNIZATION (OUTPATIENT)
Dept: PRIMARY CARE CLINIC | Age: 56
End: 2021-08-04
Payer: COMMERCIAL

## 2021-08-04 PROCEDURE — 91300 COVID-19, PFIZER VACCINE 30MCG/0.3ML DOSE: CPT | Performed by: FAMILY MEDICINE

## 2021-08-04 PROCEDURE — 0001A COVID-19, PFIZER VACCINE 30MCG/0.3ML DOSE: CPT | Performed by: FAMILY MEDICINE

## 2021-08-25 ENCOUNTER — IMMUNIZATION (OUTPATIENT)
Dept: PRIMARY CARE CLINIC | Age: 56
End: 2021-08-25
Payer: COMMERCIAL

## 2021-08-25 PROCEDURE — 0002A COVID-19, PFIZER VACCINE 30MCG/0.3ML DOSE: CPT | Performed by: FAMILY MEDICINE

## 2021-08-25 PROCEDURE — 91300 COVID-19, PFIZER VACCINE 30MCG/0.3ML DOSE: CPT | Performed by: FAMILY MEDICINE

## 2021-08-26 ENCOUNTER — TELEPHONE (OUTPATIENT)
Dept: FAMILY MEDICINE CLINIC | Age: 56
End: 2021-08-26

## 2021-08-26 NOTE — TELEPHONE ENCOUNTER
I am sorry to hear you are feeling poorly. I think it is likely you also have covid and would recommend testing. Increase fluid intake, tylenol or advil for any muscle aches. Over the counter sinus medication is ok if you develop sinus symptoms. Would you like a nausea medication. You may get testing at one of the outlying clinics, or valentine can schedule you for one through [facility]. The clinic is open MWF for testsing from 1-4. I recommend isolation x 10 days.     If near faints, no urine output 12 hours, shortness of breath with talking would need to be seen

## 2021-08-26 NOTE — TELEPHONE ENCOUNTER
Wife yesterday tested positive for covid- she started having symptoms over the weekend. Pt received 2nd pfizer covid vaccine yesterday without realizing she had tested positive (she had already been fully vaccinated). Today pt has diarrhea and temp is 100, normally runs about 97. Please advise.  Pt is reachable at [phone]

## 2021-08-26 NOTE — TELEPHONE ENCOUNTER
I transferred this not to pt's chart. Please put messages in the person's chart they are calling about. I think mariano did the same thing with a pt yesterday. Please let her know if you would.   Can get confusion    thanks

## 2021-10-25 ENCOUNTER — TELEPHONE (OUTPATIENT)
Dept: FAMILY MEDICINE CLINIC | Age: 56
End: 2021-10-25

## 2022-09-13 ENCOUNTER — TELEPHONE (OUTPATIENT)
Dept: FAMILY MEDICINE CLINIC | Age: 57
End: 2022-09-13

## 2022-09-13 NOTE — TELEPHONE ENCOUNTER
In [employer]. Has had diarrhea since Friday. Sunday night had a fever. Wanting to know if there is something you can call in for him for this. Please call into [facility] at [phone].  Please call pt back at [phone]

## (undated) DEVICE — ENDO CARRY-ON PROCEDURE KIT: Brand: ENDO CARRY-ON PROCEDURE KIT

## (undated) DEVICE — FORCEPS BX 240CM 2.4MM L NDL RAD JAW 4 M00513334

## (undated) DEVICE — CONTAINER SPEC 480ML CLR POLYSTYR 10% NEUT BUFF FRMLN ZN